# Patient Record
Sex: MALE | Race: WHITE | NOT HISPANIC OR LATINO | Employment: FULL TIME | URBAN - METROPOLITAN AREA
[De-identification: names, ages, dates, MRNs, and addresses within clinical notes are randomized per-mention and may not be internally consistent; named-entity substitution may affect disease eponyms.]

---

## 2023-02-20 ENCOUNTER — TELEPHONE (OUTPATIENT)
Dept: DERMATOLOGY | Facility: CLINIC | Age: 58
End: 2023-02-20
Payer: COMMERCIAL

## 2023-02-20 NOTE — TELEPHONE ENCOUNTER
Writer spoke with pt's wife. She declined appts offered in both [place] or [place] or being placed on the wait list stating that she would look somewhere else for a sooner appt.

## 2023-02-20 NOTE — TELEPHONE ENCOUNTER
----- Message from Leonor Serjio sent at 2/20/2023  1:10 PM CST -----  Contact: wife  Type:  Sooner Appointment Request    Name of Caller: wife  When is the first available appointment? none  Would the patient rather a call back or a response via [facility]? call  Best Call Back Number: [phone]    Additional Information: pt would like to be seen by . Pt has something on his forehead that is getting bigger. Please advise and thank you.